# Patient Record
Sex: MALE | Race: BLACK OR AFRICAN AMERICAN | NOT HISPANIC OR LATINO | URBAN - METROPOLITAN AREA
[De-identification: names, ages, dates, MRNs, and addresses within clinical notes are randomized per-mention and may not be internally consistent; named-entity substitution may affect disease eponyms.]

---

## 2024-07-04 ENCOUNTER — HOSPITAL ENCOUNTER (EMERGENCY)
Facility: HOSPITAL | Age: 64
Discharge: HOME | End: 2024-07-04
Attending: EMERGENCY MEDICINE

## 2024-07-04 VITALS
DIASTOLIC BLOOD PRESSURE: 86 MMHG | HEART RATE: 69 BPM | TEMPERATURE: 98 F | RESPIRATION RATE: 20 BRPM | OXYGEN SATURATION: 97 % | SYSTOLIC BLOOD PRESSURE: 146 MMHG

## 2024-07-04 DIAGNOSIS — S09.90XA INJURY OF HEAD, INITIAL ENCOUNTER: ICD-10-CM
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
## 2024-07-04 DIAGNOSIS — M79.642 LEFT HAND PAIN: ICD-10-CM

## 2024-07-04 LAB
OHS QRS DURATION: 92 MS
OHS QTC CALCULATION: 432 MS

## 2024-07-04 PROCEDURE — 99284 EMERGENCY DEPT VISIT MOD MDM: CPT | Mod: 25

## 2024-07-04 NOTE — ED PROVIDER NOTES
"Encounter Date: 7/4/2024       History     Chief Complaint   Patient presents with    Fall     Pt stating "I'm sick that's why I'm here." When trying to obtain more details, pt states "I got the chills when I walked in here." When asked about if fell tonight, pt states "I must have, my elbow hurts." Pt denies etoh or drug use     Pt is a 63 yo male History reviewed. No pertinent past medical history. Presenting reporting L frontal HA after slipping and falling PTA. Also reporting left hand pain with LOF. No open wounds or swelling. Pt denies CP, SOB, fever, change in strength or sensation in any extremities.       Review of patient's allergies indicates:  No Known Allergies  History reviewed. No pertinent past medical history.  History reviewed. No pertinent surgical history.  No family history on file.  Social History     Tobacco Use    Smoking status: Never    Smokeless tobacco: Never   Substance Use Topics    Drug use: Not Currently     Review of Systems   Neurological:  Positive for headaches.       Physical Exam     Initial Vitals   BP Pulse Resp Temp SpO2   07/04/24 0500 07/04/24 0328 07/04/24 0328 07/04/24 0328 07/04/24 0328   (!) 141/83 77 18 97.8 °F (36.6 °C) (!) 94 %      MAP       --                Physical Exam    Nursing note and vitals reviewed.  Constitutional: Vital signs are normal. He appears well-nourished.   HENT:   Head: Normocephalic and atraumatic.   Eyes: Conjunctivae and EOM are normal.   Neck: Neck supple. No thyroid mass present.   Normal range of motion.  Cardiovascular:  Normal rate and regular rhythm.           Pulmonary/Chest: Breath sounds normal. He has no wheezes. He has no rhonchi. He has no rales.   Abdominal: Abdomen is soft. Bowel sounds are normal. There is no abdominal tenderness.   Musculoskeletal:         General: Normal range of motion.      Cervical back: Normal range of motion and neck supple.     Neurological: He is alert and oriented to person, place, and time. He has " normal strength. No cranial nerve deficit or sensory deficit. GCS score is 15. GCS eye subscore is 4. GCS verbal subscore is 5. GCS motor subscore is 6.   Skin: Skin is warm and dry. No rash noted. No erythema.   Psychiatric: He has a normal mood and affect. His speech is normal. Cognition and memory are normal.         ED Course   Procedures  Labs Reviewed - No data to display         Imaging Results              CT Head Without Contrast (Final result)  Result time 07/04/24 07:55:04      Final result by Jamie Bustillos MD (07/04/24 07:55:04)                   Impression:      No CT evidence of acute intracranial abnormality.    Left-sided scalp soft tissue edema and scalp hematoma.  Probable punctate calcification in the left scalp though suggest correlation for any possibility of foreign body.    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    Extensive chronic microvascular ischemic changes and remote lacunar infarcts in the basal ganglia, right side worse than left.      Electronically signed by: Jamie Bustillos MD  Date:    07/04/2024  Time:    07:55               Narrative:    EXAMINATION:  CT HEAD WITHOUT CONTRAST    CLINICAL HISTORY:  Head trauma, skull fracture or hematoma (Age 19-64y);    TECHNIQUE:  Low dose axial images were obtained through the head.  Coronal and sagittal reformations were also performed. Contrast was not administered.    COMPARISON:  None.    FINDINGS:  Exam quality is limited by motion.    Mild generalized cerebral volume loss.    Extensive patchy periventricular and subcortical white matter hypoattenuation suggestive of chronic microvascular ischemic change, though nonspecific.  Remote lacunar infarct in the right basal ganglia.  Probable smaller lacunar infarcts in the left basal ganglia.    No evidence of acute territorial infarct, hemorrhage, mass effect, or midline shift.    Ventricles are normal in size and configuration.    No displaced calvarial fracture.  Few  probable punctate calcifications in the left scalp soft tissues though suggest correlation for any possibility of foreign body (for example coronal series 701, image 66).  Left-sided scalp soft tissue edema and scalp hematoma.  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  Remote deformity in the right zygomatic arch.    Patchy mucosal thickening in the paranasal sinuses.  No air-fluid levels.  Mastoid air cells are essentially clear.                                       X-Ray Hand 3 View Left (Final result)  Result time 07/04/24 08:33:39   Procedure changed from X-Ray Hand 2 View Left     Final result by Kevon Munroe DO (07/04/24 08:33:39)                   Impression:      No acute fracture or dislocation of the hand.      Electronically signed by: Kevon Munroe  Date:    07/04/2024  Time:    08:33               Narrative:    EXAMINATION:  XR HAND COMPLETE 3 VIEW LEFT    CLINICAL HISTORY:  pain;pain;. Pain in left hand    TECHNIQUE:  PA, lateral, and oblique views of the left hand were performed.    COMPARISON:  None    FINDINGS:  There is a remote fracture of the left index finger distal metacarpal shaft, with chronic ulnar angulation.  There is moderate joint space narrowing of the 3rd finger metacarpophalangeal joint.  There is mild joint space narrowing of the thumb metacarpophalangeal joint.  There is no acute fracture or dislocation.  Alignment is normal.  Soft tissues are unremarkable.                                       Medications - No data to display  Medical Decision Making  Pt rapidly assessed. VS. I cannot appreciate evidence of acute trauma in area of pt report of injury. Anticipate neg CTH, and hand Fx based on exam. Plan for DC after stable period of ED obs. Case s/o to oncoming EM team.     Amount and/or Complexity of Data Reviewed  Radiology: ordered.                                      Clinical Impression:  Final diagnoses:  [M79.642] Left hand pain  [W19.XXXA] Fall, initial  encounter (Primary)  [S09.90XA] Injury of head, initial encounter          ED Disposition Condition    Discharge           ED Prescriptions    None       Follow-up Information       Follow up With Specialties Details Why Contact Info    [facility] Emergency Medicine  If symptoms worsen [id number]  [facility] [id number]  [phone]             Mesfin Marin MD  07/04/24 [id number]

## 2024-07-04 NOTE — ED NOTES
Per registration, pt brought in by another man that witnessed pt fall to ground and hit his head. Stated that pt lost consciousness for a while. Also stated that he knows that pt takes drugs but is unsure if took anything tonight

## 2024-07-04 NOTE — ED TRIAGE NOTES
"See Euceda, a 64 y.o. male presents to the ED w/ complaint of fall. Pt says he fell and hit left side head/arm area. Denies ETOH or drug use.    Triage note:  Chief Complaint   Patient presents with    Fall     Pt stating "I'm sick that's why I'm here." When trying to obtain more details, pt states "I got the chills when I walked in here." When asked about if fell tonight, pt states "I must have, my elbow hurts." Pt denies etoh or drug use     Review of patient's allergies indicates:  No Known Allergies  No past medical history on file.  "

## 2024-07-04 NOTE — ED NOTES
Nurses Note -- 4 Eyes      7/4/2024   6:42 AM      Skin assessed during: Admit      [] No Altered Skin Integrity Present    []Prevention Measures Documented      [x] Yes- Altered Skin Integrity Present or Discovered   [x] LDA Added if Not in Epic (Describe Wound)   [x] New Altered Skin Integrity was Present on Admit and Documented in LDA   [x] Wound Image Taken    Wound Care Consulted? No    Attending Nurse:  TAM Leiva    Second RN/Staff Member:   KELI Roe

## 2024-07-04 NOTE — PROVIDER PROGRESS NOTES - EMERGENCY DEPT.
Encounter Date: 7/4/2024    ED Physician Progress Notes        Physician Note:   Case signed out to me at 7:00 a.m..  Patient presented with stating he had a fall.  CT of the head and x-ray of the hand were pending.    I evaluated the patient.  Patient is resting but is easily aroused.  He may have a contusion to the left side of his head.  He is alert with clear mental status.  Speech is clear.  Moves arms and legs equally.  Cranial nerves 2-12 are intact.    Plan is to follow-up x-ray and CT